# Patient Record
Sex: FEMALE | Race: BLACK OR AFRICAN AMERICAN | NOT HISPANIC OR LATINO | URBAN - METROPOLITAN AREA
[De-identification: names, ages, dates, MRNs, and addresses within clinical notes are randomized per-mention and may not be internally consistent; named-entity substitution may affect disease eponyms.]

---

## 2021-03-20 ENCOUNTER — IMMUNIZATION (OUTPATIENT)
Dept: PRIMARY CARE CLINIC | Facility: CLINIC | Age: 41
End: 2021-03-20

## 2021-03-20 DIAGNOSIS — Z23 NEED FOR VACCINATION: Primary | ICD-10-CM

## 2021-03-20 PROCEDURE — 91303 COVID-19,VECTOR-NR,RS-AD26,PF,0.5 ML DOSE VACCINE (JANSSEN): ICD-10-PCS | Mod: S$GLB,,, | Performed by: FAMILY MEDICINE

## 2021-03-20 PROCEDURE — 0031A COVID-19,VECTOR-NR,RS-AD26,PF,0.5 ML DOSE VACCINE (JANSSEN): ICD-10-PCS | Mod: CV19,S$GLB,, | Performed by: FAMILY MEDICINE

## 2021-03-20 PROCEDURE — 0031A COVID-19,VECTOR-NR,RS-AD26,PF,0.5 ML DOSE VACCINE (JANSSEN): CPT | Mod: CV19,S$GLB,, | Performed by: FAMILY MEDICINE

## 2021-03-20 PROCEDURE — 91303 COVID-19,VECTOR-NR,RS-AD26,PF,0.5 ML DOSE VACCINE (JANSSEN): CPT | Mod: S$GLB,,, | Performed by: FAMILY MEDICINE

## 2024-09-25 ENCOUNTER — CLINICAL SUPPORT (OUTPATIENT)
Dept: PEDIATRIC CARDIOLOGY | Facility: CLINIC | Age: 44
End: 2024-09-25
Attending: PEDIATRICS
Payer: MEDICAID

## 2024-09-25 ENCOUNTER — OFFICE VISIT (OUTPATIENT)
Dept: PEDIATRIC CARDIOLOGY | Facility: CLINIC | Age: 44
End: 2024-09-25
Payer: MEDICAID

## 2024-09-25 DIAGNOSIS — O36.8390 ARRHYTHMIA OF FETUS AFFECTING MANAGEMENT OF PREGNANCY: ICD-10-CM

## 2024-09-25 DIAGNOSIS — O35.8XX0 SUSPECTED DAMAGE TO FETUS FROM DISEASE IN MOTHER, ANTEPARTUM CONDITION, SINGLE OR UNSPECIFIED FETUS: Primary | ICD-10-CM

## 2024-09-25 PROCEDURE — 99202 OFFICE O/P NEW SF 15 MIN: CPT | Mod: PBBFAC | Performed by: PEDIATRICS

## 2024-09-25 PROCEDURE — 99203 OFFICE O/P NEW LOW 30 MIN: CPT | Mod: S$PBB,,, | Performed by: PEDIATRICS

## 2024-09-25 PROCEDURE — 99999 PR PBB SHADOW E&M-NEW PATIENT-LVL II: CPT | Mod: PBBFAC,,, | Performed by: PEDIATRICS

## 2024-09-25 PROCEDURE — 1159F MED LIST DOCD IN RCRD: CPT | Mod: CPTII,,, | Performed by: PEDIATRICS

## 2024-09-25 PROCEDURE — 1160F RVW MEDS BY RX/DR IN RCRD: CPT | Mod: CPTII,,, | Performed by: PEDIATRICS

## 2024-09-25 NOTE — PROGRESS NOTES
[facility] - [facility]      OB:  Dr. Henrietta Shaver    MFM:  Dr. Caleb Hernandez      Today, I had the pleasure of evaluating Francisco Baker who is now 44 y.o. and carrying her  7  pregnancy at 26 weeks gestation with an ELISABET of 12/15/2024. She was referred for evaluation of the fetal heart due family history of congenital heart disease and possible VSD in this fetus.      She is carrying a female fetus named Zhanna Grier.      Obstetric History:    .  Her OB history is remarkable for conjoined twins and fetal demise.     History reviewed. No pertinent past medical history.    No current outpatient medications on file.    Family History: Positive for congenital heart disease.  Negative for genetic syndromes, multiple miscarriages or other congenital anomalies.    Social History: Ms. Francisco Baker is  to the father of the baby.  The father of the baby is involved.       FETAL ECHOCARDIOGRAM (summary):  The echocardiogram demonstrated a grossly structurally normal fetal heart. There is a normal fetal foramen ovale with right to left flow. There is no significant atrioventricular valve insufficiency. Good cardiac contractility. The ductus arteriosus was visualized with right to left flow. The aortic arch appeared normal in size without obstruction. No pericardial effusion.       Impression:  Single active female fetus at 26 wga.  Normal fetal echocardiogram.      Todays fetal echocardiogram is normal, within the limitations of fetal echocardiography.  I discussed with her that fetal echocardiography is insufficiently sensitive to rule out all septal defects, anomalies of pulmonary and systemic veins, arch anomalies, and some valvar abnormalities, nor can it ensure that the ductus arteriosus and foramen ovale will spontaneously close.       Recommendations:  Location, timing, and mode of delivery will be determined by the obstetrical team.  She does not require further  follow-up in the fetal echocardiography clinic, but I would be happy to see her again if additional questions or concerns arise.    Should there be any concerns about the baby's heart after birth, a post- echocardiogram and cardiology consultation are recommended.       The above information was discussed in detail including the use of diagrams, with 35 minutes of total face to face time, with greater than 50% with counseling and coordination of care.  The discussion of the diagnosis and treatment options is as described above.        Shun Caceres MD  Pediatric Cardiology  [id number]  [place] [id number]  Office: [phone]